# Patient Record
Sex: MALE | Race: WHITE | NOT HISPANIC OR LATINO | Employment: FULL TIME | URBAN - METROPOLITAN AREA
[De-identification: names, ages, dates, MRNs, and addresses within clinical notes are randomized per-mention and may not be internally consistent; named-entity substitution may affect disease eponyms.]

---

## 2020-09-09 ENCOUNTER — TELEPHONE (OUTPATIENT)
Dept: MEDICAL GROUP | Facility: LAB | Age: 20
End: 2020-09-09

## 2020-09-10 ENCOUNTER — OFFICE VISIT (OUTPATIENT)
Dept: MEDICAL GROUP | Facility: LAB | Age: 20
End: 2020-09-10
Payer: COMMERCIAL

## 2020-09-10 VITALS
HEIGHT: 73 IN | SYSTOLIC BLOOD PRESSURE: 110 MMHG | OXYGEN SATURATION: 98 % | DIASTOLIC BLOOD PRESSURE: 60 MMHG | BODY MASS INDEX: 19.09 KG/M2 | HEART RATE: 80 BPM | TEMPERATURE: 98.1 F | RESPIRATION RATE: 16 BRPM | WEIGHT: 144 LBS

## 2020-09-10 DIAGNOSIS — F95.9 TIC DISORDER: ICD-10-CM

## 2020-09-10 PROBLEM — R56.9 PARTIAL SEIZURE (HCC): Status: RESOLVED | Noted: 2020-09-10 | Resolved: 2020-09-10

## 2020-09-10 PROBLEM — R56.9 PARTIAL SEIZURE (HCC): Status: ACTIVE | Noted: 2020-09-10

## 2020-09-10 PROCEDURE — 99202 OFFICE O/P NEW SF 15 MIN: CPT | Performed by: PHYSICIAN ASSISTANT

## 2020-09-10 SDOH — HEALTH STABILITY: MENTAL HEALTH: HOW OFTEN DO YOU HAVE A DRINK CONTAINING ALCOHOL?: MONTHLY OR LESS

## 2020-09-10 ASSESSMENT — ENCOUNTER SYMPTOMS
SPEECH CHANGE: 0
CONSTITUTIONAL NEGATIVE: 1
FOCAL WEAKNESS: 0
DIZZINESS: 0
GASTROINTESTINAL NEGATIVE: 1
MUSCULOSKELETAL NEGATIVE: 1
SENSORY CHANGE: 0
WEAKNESS: 0
CARDIOVASCULAR NEGATIVE: 1
PSYCHIATRIC NEGATIVE: 1
RESPIRATORY NEGATIVE: 1
HEADACHES: 0
EYES NEGATIVE: 1
LOSS OF CONSCIOUSNESS: 0

## 2020-09-10 ASSESSMENT — PATIENT HEALTH QUESTIONNAIRE - PHQ9: CLINICAL INTERPRETATION OF PHQ2 SCORE: 3

## 2020-09-10 NOTE — PROGRESS NOTES
CC: Julian Hutchison is a 20 y.o. male new patient here for further evaluation of Tic disorder vs. Partial seizure       HPI:    Tic disorder  New problem to me  Pt reports that he has been having what he believes are tics since about age 13. Though he and his family have concern that they may be seizures.    No episodes of LOC    These tic occur about once per week. Notices increase in frequency when he is more stressed.   When pt was younger the jerking movement of the head would be to the left toward his left shoulder. Now it seems to jerk upward.     No headaches.   No history of thyroid disease.   Was eating Vegan diet last year, consumes well balanced diet. Did not notice any changes in tics during vegan diet timeframe.     Does use marijuana daily which seems to ease symptoms.           Current medicines (including changes today)  No current outpatient medications on file.     No current facility-administered medications for this visit.      He  has a past medical history of Tic disorder.  He  has no past surgical history on file.  Social History     Tobacco Use   • Smoking status: Never Smoker   • Smokeless tobacco: Never Used   Substance Use Topics   • Alcohol use: Yes     Frequency: Monthly or less   • Drug use: Yes     Types: Marijuana     Comment: Improves tic symptoms      Social History     Social History Narrative    Lives with parents and his aunt.     3 sibling; 1 brother and 2 sister - all older.         Well balanced diet, history of vegan diet last year.         2 cats and 4 dogs.          Family History   Problem Relation Age of Onset   • Psychiatric Illness Mother    • Hypertension Father    • Thyroid Maternal Grandmother      Family Status   Relation Name Status   • Mo  Alive   • Fa  Alive         ROS  Review of Systems   Constitutional: Negative.    HENT: Negative for congestion, ear pain and hearing loss.    Eyes: Negative.    Respiratory: Negative.    Cardiovascular: Negative.   "  Gastrointestinal: Negative.    Genitourinary: Negative.    Musculoskeletal: Negative.    Skin: Negative for itching and rash.   Neurological: Negative for dizziness, sensory change, speech change, focal weakness, loss of consciousness, weakness and headaches.        +recurrent jerking motion of head   Endo/Heme/Allergies: Negative for environmental allergies.   Psychiatric/Behavioral: Negative.         Objective:     /60   Pulse 80   Temp 36.7 °C (98.1 °F)   Resp 16   Ht 1.854 m (6' 1\")   Wt 65.3 kg (144 lb)   SpO2 98%  Body mass index is 19 kg/m².  Physical Exam:    Constitutional: Alert, no distress.  Skin: Warm, dry, good turgor, no rashes in visible areas.  Eye: Equal, round and reactive, conjunctiva clear, lids normal.  Neck: Trachea midline, no masses, no thyromegaly. No cervical or supraclavicular lymphadenopathy.  Respiratory: Unlabored respiratory effort, lungs clear to auscultation, no wheezes, no ronchi.  Cardiovascular: Normal S1, S2, no murmur, no edema.  Neuro: Non focal, no jerking movements observed while in clinic.   Psych: Alert and oriented x3, normal affect and mood.      Assessment and Plan:   The following treatment plan was discussed    1. Tic disorder  Likely tic disorder worsened/exacerbated by heightened stressors.  Would appreciate Neuro eval r/o seizure activity.   Continue well balanced diet and exercise.   Continue to monitor symptoms.   F/u prn.   - REFERRAL TO NEUROLOGY  - TSH; Future  - VITAMIN D,25 HYDROXY; Future  - CBC WITH DIFFERENTIAL; Future  - VITAMIN B12; Future  - FOLATE; Future      Followup: Return if symptoms worsen or fail to improve.       Lupis Omalley P.A.-C.  Supervising MD: Dr. Michelle Stallworth MD  09/10/20      "

## 2020-09-10 NOTE — ASSESSMENT & PLAN NOTE
New problem to me  Pt reports that he has been having what he believes are tics since about age 13. Though he and his family have concern that they may be seizures.    No episodes of LOC    These tic occur about once per week. Notices increase in frequency when he is more stressed.   When pt was younger the jerking movement of the head would be to the left toward his left shoulder. Now it seems to jerk upward.     No headaches.   No history of thyroid disease.   Was eating Vegan diet last year, consumes well balanced diet. Did not notice any changes in tics during vegan diet timeframe.     Does use marijuana daily which seems to ease symptoms.

## 2020-09-22 ENCOUNTER — OFFICE VISIT (OUTPATIENT)
Dept: NEUROLOGY | Facility: MEDICAL CENTER | Age: 20
End: 2020-09-22
Payer: COMMERCIAL

## 2020-09-22 ENCOUNTER — HOSPITAL ENCOUNTER (OUTPATIENT)
Dept: LAB | Facility: MEDICAL CENTER | Age: 20
End: 2020-09-22
Attending: PHYSICIAN ASSISTANT
Payer: COMMERCIAL

## 2020-09-22 VITALS
TEMPERATURE: 97.2 F | BODY MASS INDEX: 18.79 KG/M2 | HEIGHT: 73 IN | DIASTOLIC BLOOD PRESSURE: 74 MMHG | HEART RATE: 66 BPM | OXYGEN SATURATION: 100 % | SYSTOLIC BLOOD PRESSURE: 100 MMHG | WEIGHT: 141.76 LBS

## 2020-09-22 DIAGNOSIS — F95.9 TIC DISORDER: ICD-10-CM

## 2020-09-22 DIAGNOSIS — F95.2 TOURETTE SYNDROME: Primary | ICD-10-CM

## 2020-09-22 LAB
25(OH)D3 SERPL-MCNC: 21 NG/ML (ref 30–100)
BASOPHILS # BLD AUTO: 0.9 % (ref 0–1.8)
BASOPHILS # BLD: 0.05 K/UL (ref 0–0.12)
EOSINOPHIL # BLD AUTO: 0.23 K/UL (ref 0–0.51)
EOSINOPHIL NFR BLD: 4.3 % (ref 0–6.9)
ERYTHROCYTE [DISTWIDTH] IN BLOOD BY AUTOMATED COUNT: 42.2 FL (ref 35.9–50)
FOLATE SERPL-MCNC: 12.9 NG/ML
HCT VFR BLD AUTO: 48.8 % (ref 42–52)
HGB BLD-MCNC: 17 G/DL (ref 14–18)
IMM GRANULOCYTES # BLD AUTO: 0.01 K/UL (ref 0–0.11)
IMM GRANULOCYTES NFR BLD AUTO: 0.2 % (ref 0–0.9)
LYMPHOCYTES # BLD AUTO: 2.15 K/UL (ref 1–4.8)
LYMPHOCYTES NFR BLD: 40.1 % (ref 22–41)
MCH RBC QN AUTO: 31.9 PG (ref 27–33)
MCHC RBC AUTO-ENTMCNC: 34.8 G/DL (ref 33.7–35.3)
MCV RBC AUTO: 91.6 FL (ref 81.4–97.8)
MONOCYTES # BLD AUTO: 0.48 K/UL (ref 0–0.85)
MONOCYTES NFR BLD AUTO: 9 % (ref 0–13.4)
NEUTROPHILS # BLD AUTO: 2.44 K/UL (ref 1.82–7.42)
NEUTROPHILS NFR BLD: 45.5 % (ref 44–72)
NRBC # BLD AUTO: 0 K/UL
NRBC BLD-RTO: 0 /100 WBC
PLATELET # BLD AUTO: 282 K/UL (ref 164–446)
PMV BLD AUTO: 10.1 FL (ref 9–12.9)
RBC # BLD AUTO: 5.33 M/UL (ref 4.7–6.1)
TSH SERPL DL<=0.005 MIU/L-ACNC: 2.57 UIU/ML (ref 0.38–5.33)
VIT B12 SERPL-MCNC: 525 PG/ML (ref 211–911)
WBC # BLD AUTO: 5.4 K/UL (ref 4.8–10.8)

## 2020-09-22 PROCEDURE — 82306 VITAMIN D 25 HYDROXY: CPT

## 2020-09-22 PROCEDURE — 84443 ASSAY THYROID STIM HORMONE: CPT

## 2020-09-22 PROCEDURE — 99204 OFFICE O/P NEW MOD 45 MIN: CPT | Performed by: PSYCHIATRY & NEUROLOGY

## 2020-09-22 PROCEDURE — 36415 COLL VENOUS BLD VENIPUNCTURE: CPT

## 2020-09-22 PROCEDURE — 82607 VITAMIN B-12: CPT

## 2020-09-22 PROCEDURE — 85025 COMPLETE CBC W/AUTO DIFF WBC: CPT

## 2020-09-22 PROCEDURE — 82746 ASSAY OF FOLIC ACID SERUM: CPT

## 2020-09-22 NOTE — PROGRESS NOTES
"[facility] NEUROLOGY  NEW PATIENT VISIT    Referral source: Lupis Omalley PA-C    CC: tick disorder vs seizure    HISTORY OF ILLNESS:  Julian Hutchison is a 20 y.o. man with a history most notable for tic disorder.  Today, he was unaccompanied, and he provided the following history:    2013:  Julian began experiencing \"tics\" around age 13.  These were characterized by intermittent, left lateral neck flexion accompanied by a \"clicking\" noise.  Initially these were fairly infrequent, and over time they became more frequent.  Sometimes he could tell (he would have a \"kind of sense\") when a tic was about to occur.  Sometimes the tics were associated with an \"urge.\"  At other times the tics occurred without any warning at all.  Rarely, Julian can delay the tic for a brief moment, but usually he cannot stop the tic even if he really wanted to.    There have been periods when the tics were better or worse.  Sometimes he could go a couple of weeks or even a month without a tic.    Sometimes he felt as if his head were \"locked\" to the side.  If he straightened up his neck the tic would occur again.  This would last 5-10 minutes.  This hasn't happened for about 1 year.    2019:  In 2019 Julian started smoking marijuana.  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  Around this time his tics became less severe.    Lately, Julian's tics have changed somewhat.  Instead of left lateral neck flexion his neck will now extend so that his chin elevates.  The vocal component has also changed, and he make a \"brief exhale\" sound.  This occurs every couple of days.  He does not experience \"severe\" or prolonged tics.    Julian is not particularly bothered by these tics.  He is interested in investigating the possibility that these represent a serious or dangerous illness.  He is not embarrassed by the tics.  They do not interfere with his life.  He denies any anxiety " or obsessive/compulsive behaviors.    Julian has never had abnormal movements or sensations involving the upper or lower extremities.  He has never fallen in the setting of one of these events.  He has never lost awareness.  There has never been any incontinence of urine or stool.    Julian has 3 siblings who are all healthy.  There is no family history of tic disorder, seizures, or dystonia.    MEDICAL AND SURGICAL HISTORY:  Past Medical History:   Diagnosis Date   • Tic disorder      History reviewed. No pertinent surgical history.    MEDICATIONS:  No current outpatient medications on file.     SOCIAL HISTORY:  Social History     Tobacco Use   • Smoking status: Never Smoker   • Smokeless tobacco: Never Used   Substance Use Topics   • Alcohol use: Yes     Frequency: Monthly or less     Social History     Social History Narrative    Lives with parents and his aunt.     3 sibling; 1 brother and 2 sister - all older.         Well balanced diet, history of vegan diet last year.         2 cats and 4 dogs.          FAMILY HISTORY:  Family History   Problem Relation Age of Onset   • Psychiatric Illness Mother         schizophrenia   • Hypertension Father    • Anxiety disorder Father    • Thyroid Maternal Grandmother      REVIEW OF SYSTEMS:  A ROS was completed.  Pertinent positives and negatives were included in the HPI, above.  All other systems were reviewed and are negative.    PHYSICAL EXAM:  General/Medical:  - NAD  - hair, skin, nails, and joints were normal  - heart rate and rhythm were regular    Neuro:  MENTAL STATUS: awake and alert; no deficits of speech or language; fully oriented; affect was appropriate to situation; pleasant, cooperative    CRANIAL NERVES:    II: acuity was: J1/J1+; discs sharp; pupils 3/3 to 2/2 without a relative afferent pupillary defect; fields intact to confrontation    III/IV/VI: versions intact without nystagmus    V: facial sensation symmetric to light touch    VII: facial expression  "symmetric    VIII: hearing intact to finger rub    IX/X: palate elevates symmetrically    XI: shoulder shrug symmetric    XII: tongue midline    MOTOR:  - bulk and tone were normal throughout  Upper Extremity Strength  (R/L)    5/5   Elbow flexion 5/5   Elbow extension 5/5   Shoulder abduction 5/5     Lower Extremity Strength  (R/L)   Hip flexion 5/5   Knee extension 5/5   Knee flexion 5/5   Ankle plantarflexion 5/5   Ankle dorsiflexion 5/5     - can walk on toes and heels  - can stand from a seated position with arms crossed  - no pronator drift; no abnormal movements; no \"tics\" during the exam    SENSATION:  - light touch: intact in the upper and lower extremities  - vibration (R/L, seconds): NT at the great toes  - pinprick: NT  - proprioception: NT  - Romberg was: NT    COORDINATION:  - finger to nose was normal, no ataxia on exam  - finger tapping was rapid and accurate, bilaterally    REFLEXES:  Reflex Right Left   BR 2+ 2+   Biceps 2+ 2+   Triceps 2+ 2+   Patellae 2+ 2+   Achilles 2+ 2+   Toes NT NT     GAIT:  - narrow base and normal; intact heel-raised, toe-raised, and tandem gait    REVIEW OF IMAGING STUDIES:  No head imaging available for review.    REVIEW OF LABORATORY STUDIES:  No data available for review.    ASSESSMENT:  Julian Hutchison is a 20 y.o. man with Tourette syndrome.  Supporting data include: history of both motor and phonic tics, onset before age 18, duration of symptoms for >1 year, evolution of tics over time, premonitory sensation, and variability with stress.  I discussed the diagnosis with Julian at length.  I asked about comorbid ADHD, OCD, and anxiety symptoms, and these were absent.  Unusual aspects about Jluian's symptoms include: absence of suppressibility and long duration of some symptoms.  I do not think he he has chorea, dystonia, or a paroxysmal dyskinesia.  We discussed the utility of EEG due to his (and other's) concern for seizure.  The diagnotic yield of EEG will be " limited unless Julian happens to have a tic during the fairly brief recording session.  He understood the limitations of outpatient EEG, but he expressed a desire to proceed with this testing for reassurance.    PLAN:  Tourette Syndrome:  - will obtain EEG  - no treatment recommended at this time  - RTC PRN    Signed: Vipul Long M.D. at 9:01 AM on 09/22/20

## 2020-09-23 DIAGNOSIS — E55.9 VITAMIN D DEFICIENCY: ICD-10-CM

## 2020-09-23 RX ORDER — ERGOCALCIFEROL 1.25 MG/1
50000 CAPSULE ORAL
Qty: 12 CAP | Refills: 0 | Status: SHIPPED | OUTPATIENT
Start: 2020-09-23

## 2020-09-23 NOTE — PROGRESS NOTES
Vitamin D 50,000 IU to be taken once every 7 days- I cant sign for it until Pharmacy has been updated.

## 2020-12-15 ENCOUNTER — TELEPHONE (OUTPATIENT)
Dept: MEDICAL GROUP | Facility: LAB | Age: 20
End: 2020-12-15

## 2020-12-15 DIAGNOSIS — E55.9 VITAMIN D DEFICIENCY: ICD-10-CM

## 2020-12-15 RX ORDER — ERGOCALCIFEROL 1.25 MG/1
CAPSULE ORAL
Qty: 12 CAP | Refills: 0 | OUTPATIENT
Start: 2020-12-15

## 2020-12-15 NOTE — TELEPHONE ENCOUNTER
Let's have him repeat the Vitamin D lab.   At this time he can take a Vitamin D3 supplement at 2000 IU/day.       Thank you!  Lupis Omalley P.A.-C.

## 2020-12-15 NOTE — TELEPHONE ENCOUNTER
Phone Number Called: [phone]    Call outcome: Did not leave a detailed message. Requested patient to call back.    Message: Left voicemail message requesting return call regarding updates.     *Patient needs updated Vit D lab draw and OTC Vit D3 2000 IU/daily. Please see refill encounter from 12/15/20.

## 2020-12-15 NOTE — TELEPHONE ENCOUNTER
Received request via: Pharmacy    Was the patient seen in the last year in this department? Yes  LOV: 9/23/2020    Does the patient have an active prescription (recently filled or refills available) for medication(s) requested? No

## 2023-01-31 ENCOUNTER — NON-PROVIDER VISIT (OUTPATIENT)
Dept: URGENT CARE | Facility: CLINIC | Age: 23
End: 2023-01-31

## 2023-01-31 DIAGNOSIS — Z02.1 PRE-EMPLOYMENT DRUG SCREENING: ICD-10-CM

## 2023-01-31 LAB
AMP AMPHETAMINE: NEGATIVE
COC COCAINE: NEGATIVE
INT CON NEG: NORMAL
INT CON POS: NORMAL
MET METHAMPHETAMINES: NEGATIVE
OPI OPIATES: NEGATIVE
PCP PHENCYCLIDINE: NEGATIVE
POC DRUG COMMENT 753798-OCCUPATIONAL HEALTH: NEGATIVE
THC: NEGATIVE

## 2023-01-31 PROCEDURE — 80305 DRUG TEST PRSMV DIR OPT OBS: CPT | Performed by: PHYSICIAN ASSISTANT

## 2023-03-30 ENCOUNTER — NON-PROVIDER VISIT (OUTPATIENT)
Dept: URGENT CARE | Facility: CLINIC | Age: 23
End: 2023-03-30

## 2023-03-30 DIAGNOSIS — Z02.1 PRE-EMPLOYMENT DRUG SCREENING: ICD-10-CM

## 2023-03-30 LAB
AMP AMPHETAMINE: NORMAL
COC COCAINE: NORMAL
INT CON NEG: NEGATIVE
INT CON POS: POSITIVE
MET METHAMPHETAMINES: NORMAL
OPI OPIATES: NORMAL
PCP PHENCYCLIDINE: NORMAL
POC DRUG COMMENT 753798-OCCUPATIONAL HEALTH: NEGATIVE
THC: NORMAL

## 2023-03-30 PROCEDURE — 80305 DRUG TEST PRSMV DIR OPT OBS: CPT
